# Patient Record
Sex: FEMALE | Race: WHITE | Employment: FULL TIME | URBAN - METROPOLITAN AREA
[De-identification: names, ages, dates, MRNs, and addresses within clinical notes are randomized per-mention and may not be internally consistent; named-entity substitution may affect disease eponyms.]

---

## 2024-06-24 ENCOUNTER — HOSPITAL ENCOUNTER (OUTPATIENT)
Dept: CT IMAGING | Facility: HOSPITAL | Age: 54
End: 2024-06-24
Attending: INTERNAL MEDICINE

## 2024-06-24 VITALS — SYSTOLIC BLOOD PRESSURE: 106 MMHG | DIASTOLIC BLOOD PRESSURE: 64 MMHG

## 2024-06-24 DIAGNOSIS — Z13.6 SCREENING FOR CARDIOVASCULAR CONDITION: ICD-10-CM

## 2024-06-24 NOTE — PROGRESS NOTES
Date of Service 6/24/2024    GREGORY PINZON  Date of Birth 6/29/1970    Patient Age: 53 year old    PCP: Mayra Johnston MD  [address] [address]    Heart Scan Consult  Preliminary Heart Scan Score: 0    Previous Screening  Heart Scan Completed Previously: No        Peripheral Vascular Scan Completed Previously: No          Risk Factors  Personal Risk Factors  Non-alterable Risk Factors: Personal History;Age;Gender;Family History  Alterable Risk Factors: Abnormal Cholesterol          Blood Pressure     /64 (BP Location: Right arm)     (Normal =< 120/80,  Elevated = 120-129/ >80,  High Stage1 130-139/80-89 , Stage2 >140/>90)    Lipid Profile  Cholesterol: 165, done on 2/26/2021.  HDL Cholesterol: 72, done on 2/26/2021.  LDL Cholesterol: 80, done on 2/26/2021.  TriGlycerides 65, done on 2/26/2021.    Cholesterol Goals  Value   Total  =< 200   HDL  = > 45 Men = > 55 Women   LDL   =< 100   Triglycerides  =< 150       Glucose and Hemoglobin A1C  Lab Results   Component Value Date     02/26/2021     (Normal Fasting Glucose < 100mg/dl )    Nurse Review  Risk factor information and results reviewed with Nurse: Yes    Recommended Follow Up:  Consult your physician regarding::   Final Heart Scan Report;  Discuss potential for Incidental Finding;  Discuss Potential for Score Variance      Recommendations for Change:  Nutrition Changes: Low Saturated Fat;Low Fat Dairy;Increase Fiber    Cholesterol Modification (goal of therapy depends upon your risk):   Increase HDL (Healthy/Good) Normal >45 Men >55 Women     (Today's FASTING Cholestech Values:  Total Cholesterol-132, HDL-42, LDL-74, Triglycerides-79, Glucose-92)    Exercise: Enhance Current Program                   Repeat Heart Scan:   5 years if Calcium Score is 0.0              [place] Recommended Resources:  Recommended Resources: Upcoming Classes, Medical Services and Health Library [email or web address];    PV Screening  Recommended  PV Screening: Carotids;Abdomen;Ankle-Brachial Index (RUBÉN)      Other Resources:: Educational handouts provided.      Esperanza QUIÑONES RN        Please Contact the Nurse Heart Line with any Questions or Concerns [phone].